# Patient Record
Sex: MALE | Race: WHITE | NOT HISPANIC OR LATINO | URBAN - METROPOLITAN AREA
[De-identification: names, ages, dates, MRNs, and addresses within clinical notes are randomized per-mention and may not be internally consistent; named-entity substitution may affect disease eponyms.]

---

## 2022-01-01 ENCOUNTER — EMERGENCY (EMERGENCY)
Facility: HOSPITAL | Age: 67
LOS: 0 days | End: 2022-10-03
Attending: EMERGENCY MEDICINE | Admitting: EMERGENCY MEDICINE

## 2022-01-01 VITALS — DIASTOLIC BLOOD PRESSURE: 70 MMHG | SYSTOLIC BLOOD PRESSURE: 110 MMHG | RESPIRATION RATE: 14 BRPM | HEART RATE: 64 BPM

## 2022-01-01 VITALS — HEIGHT: 74 IN | WEIGHT: 315 LBS

## 2022-01-01 DIAGNOSIS — I95.9 HYPOTENSION, UNSPECIFIED: ICD-10-CM

## 2022-01-01 DIAGNOSIS — R10.9 UNSPECIFIED ABDOMINAL PAIN: ICD-10-CM

## 2022-01-01 DIAGNOSIS — Z87.891 PERSONAL HISTORY OF NICOTINE DEPENDENCE: ICD-10-CM

## 2022-01-01 DIAGNOSIS — R53.83 OTHER FATIGUE: ICD-10-CM

## 2022-01-01 DIAGNOSIS — I71.30 ABDOMINAL AORTIC ANEURYSM, RUPTURED, UNSPECIFIED: ICD-10-CM

## 2022-01-01 DIAGNOSIS — I46.9 CARDIAC ARREST, CAUSE UNSPECIFIED: ICD-10-CM

## 2022-01-01 LAB
ALBUMIN SERPL ELPH-MCNC: 4.2 G/DL — SIGNIFICANT CHANGE UP (ref 3.5–5.2)
ALP SERPL-CCNC: 94 U/L — SIGNIFICANT CHANGE UP (ref 30–115)
ALT FLD-CCNC: 24 U/L — SIGNIFICANT CHANGE UP (ref 0–41)
ANION GAP SERPL CALC-SCNC: 23 MMOL/L — HIGH (ref 7–14)
APTT BLD: 26.5 SEC — LOW (ref 27–39.2)
AST SERPL-CCNC: 22 U/L — SIGNIFICANT CHANGE UP (ref 0–41)
BASOPHILS # BLD AUTO: 0.08 K/UL — SIGNIFICANT CHANGE UP (ref 0–0.2)
BASOPHILS NFR BLD AUTO: 1.2 % — HIGH (ref 0–1)
BILIRUB SERPL-MCNC: 0.5 MG/DL — SIGNIFICANT CHANGE UP (ref 0.2–1.2)
BLD GP AB SCN SERPL QL: SIGNIFICANT CHANGE UP
BUN SERPL-MCNC: 18 MG/DL — SIGNIFICANT CHANGE UP (ref 10–20)
CALCIUM SERPL-MCNC: 9.8 MG/DL — SIGNIFICANT CHANGE UP (ref 8.4–10.5)
CHLORIDE SERPL-SCNC: 107 MMOL/L — SIGNIFICANT CHANGE UP (ref 98–110)
CO2 SERPL-SCNC: 19 MMOL/L — SIGNIFICANT CHANGE UP (ref 17–32)
CREAT SERPL-MCNC: 1.2 MG/DL — SIGNIFICANT CHANGE UP (ref 0.7–1.5)
EGFR: 67 ML/MIN/1.73M2 — SIGNIFICANT CHANGE UP
EOSINOPHIL # BLD AUTO: 0.18 K/UL — SIGNIFICANT CHANGE UP (ref 0–0.7)
EOSINOPHIL NFR BLD AUTO: 2.7 % — SIGNIFICANT CHANGE UP (ref 0–8)
GLUCOSE SERPL-MCNC: 191 MG/DL — HIGH (ref 70–99)
HCT VFR BLD CALC: 42.1 % — SIGNIFICANT CHANGE UP (ref 42–52)
HGB BLD-MCNC: 12.9 G/DL — LOW (ref 14–18)
IMM GRANULOCYTES NFR BLD AUTO: 0.4 % — HIGH (ref 0.1–0.3)
INR BLD: 1.05 RATIO — SIGNIFICANT CHANGE UP (ref 0.65–1.3)
LACTATE SERPL-SCNC: 14.4 MMOL/L — CRITICAL HIGH (ref 0.7–2)
LYMPHOCYTES # BLD AUTO: 2.56 K/UL — SIGNIFICANT CHANGE UP (ref 1.2–3.4)
LYMPHOCYTES # BLD AUTO: 37.9 % — SIGNIFICANT CHANGE UP (ref 20.5–51.1)
MAGNESIUM SERPL-MCNC: 2.8 MG/DL — HIGH (ref 1.8–2.4)
MCHC RBC-ENTMCNC: 29.6 PG — SIGNIFICANT CHANGE UP (ref 27–31)
MCHC RBC-ENTMCNC: 30.6 G/DL — LOW (ref 32–37)
MCV RBC AUTO: 96.6 FL — HIGH (ref 80–94)
MONOCYTES # BLD AUTO: 0.56 K/UL — SIGNIFICANT CHANGE UP (ref 0.1–0.6)
MONOCYTES NFR BLD AUTO: 8.3 % — SIGNIFICANT CHANGE UP (ref 1.7–9.3)
NEUTROPHILS # BLD AUTO: 3.35 K/UL — SIGNIFICANT CHANGE UP (ref 1.4–6.5)
NEUTROPHILS NFR BLD AUTO: 49.5 % — SIGNIFICANT CHANGE UP (ref 42.2–75.2)
NRBC # BLD: 0 /100 WBCS — SIGNIFICANT CHANGE UP (ref 0–0)
PLATELET # BLD AUTO: 274 K/UL — SIGNIFICANT CHANGE UP (ref 130–400)
POTASSIUM SERPL-MCNC: 4.1 MMOL/L — SIGNIFICANT CHANGE UP (ref 3.5–5)
POTASSIUM SERPL-SCNC: 4.1 MMOL/L — SIGNIFICANT CHANGE UP (ref 3.5–5)
PROT SERPL-MCNC: 6.4 G/DL — SIGNIFICANT CHANGE UP (ref 6–8)
PROTHROM AB SERPL-ACNC: 12.1 SEC — SIGNIFICANT CHANGE UP (ref 9.95–12.87)
RBC # BLD: 4.36 M/UL — LOW (ref 4.7–6.1)
RBC # FLD: 12.8 % — SIGNIFICANT CHANGE UP (ref 11.5–14.5)
SARS-COV-2 RNA SPEC QL NAA+PROBE: SIGNIFICANT CHANGE UP
SODIUM SERPL-SCNC: 149 MMOL/L — HIGH (ref 135–146)
TROPONIN T SERPL-MCNC: <0.01 NG/ML — SIGNIFICANT CHANGE UP
WBC # BLD: 6.76 K/UL — SIGNIFICANT CHANGE UP (ref 4.8–10.8)
WBC # FLD AUTO: 6.76 K/UL — SIGNIFICANT CHANGE UP (ref 4.8–10.8)

## 2022-01-01 PROCEDURE — 36556 INSERT NON-TUNNEL CV CATH: CPT

## 2022-01-01 PROCEDURE — 99285 EMERGENCY DEPT VISIT HI MDM: CPT | Mod: 25

## 2022-01-01 PROCEDURE — 31500 INSERT EMERGENCY AIRWAY: CPT

## 2022-01-01 PROCEDURE — 71045 X-RAY EXAM CHEST 1 VIEW: CPT | Mod: 26

## 2022-01-01 RX ORDER — TRANEXAMIC ACID 100 MG/ML
1000 INJECTION, SOLUTION INTRAVENOUS ONCE
Refills: 0 | Status: COMPLETED | OUTPATIENT
Start: 2022-01-01 | End: 2022-01-01

## 2022-01-01 RX ORDER — KETAMINE HYDROCHLORIDE 100 MG/ML
100 INJECTION INTRAMUSCULAR; INTRAVENOUS ONCE
Refills: 0 | Status: DISCONTINUED | OUTPATIENT
Start: 2022-01-01 | End: 2022-01-01

## 2022-01-01 RX ADMIN — TRANEXAMIC ACID 220 MILLIGRAM(S): 100 INJECTION, SOLUTION INTRAVENOUS at 20:35

## 2022-10-03 PROBLEM — Z00.00 ENCOUNTER FOR PREVENTIVE HEALTH EXAMINATION: Status: ACTIVE | Noted: 2022-01-01

## 2022-10-03 NOTE — ED PROVIDER NOTE - PHYSICAL EXAMINATION
--EXAM--  VITAL SIGNS: I have reviewed vs documented at present.  CONSTITUTIONAL: Well-developed; well-nourished; in no acute distress.     CARD: S1, S2, Regular rate and rhythm.   RESP: No wheezes, rales or rhonchi.

## 2022-10-03 NOTE — ED PROVIDER NOTE - OBJECTIVE STATEMENT
this 66 yo male presents to ed for evaluation of abdominal pain since 730. wife called ambulance when pain got worse. ems states that patient more lethargic

## 2022-10-03 NOTE — ED PROVIDER NOTE - CARE PLAN
1 Principal Discharge DX:	Ruptured abdominal aortic aneurysm (AAA)  Secondary Diagnosis:	Cardiac arrest

## 2022-10-03 NOTE — ED PROVIDER NOTE - ATTENDING APP SHARED VISIT CONTRIBUTION OF CARE
I reviewed and verified the documentation and  and independently performed the documented  MDM.    67yM  (hx from wife and EMS) no pmhx although has not seen PMD in a "While",  former smoker "years ago", pw  abdominal pain  back pain  started  at 730pm - on EMS arrival  Pt hypotensive  unable to obtain BP -  in ED  bedside sono with free fluid RUQ and  AAA -   central line placed,  Massive transfusion protocol [place] site  initiated,  awake intubation performed.  lights and sirens EMS made aware as soon as AAA identified on bedside sono.  ALS lights and sirens called for transport north,  Vascular Dr Sorto  (around 815 , and ED  attending [place] site  made aware of patient. I reviewed and verified the documentation and  and independently performed the documented  MDM.    67yM  (hx from wife and EMS) no pmhx although has not seen PMD in a "While",  former smoker "years ago", pw  abdominal pain  back pain  started  at 730pm - on EMS arrival  Pt hypotensive  unable to obtain BP -  in ED  bedside sono with free fluid RUQ and  AAA -   central line placed,  Massive transfusion protocol [facility] site  initiated,  awake intubation performed for airway protection.  lights and sirens EMS made aware as soon as AAA identified on bedside sono.  ALS lights and sirens called for transport north,  Vascular Dr Sorto  (around 815 , and ED  attending [place] site  made aware of patient.    call from Medical director  Dr Palomares  around 830 -  need approval  for  additional trained personnel in ambulance. To expedite,  ED  south [facility] sent to go with ambulance - once approval was granted  pt   sent  [place].   Pt stable  for transport - Intubated  - ETT  confirmed on CXR,  visualization  during glidescope,  bl BS,  PRBC  running  m  /60.   Dw  Vascular again - aware of  patient being transferred now to [place]

## 2022-10-03 NOTE — CHART NOTE - NSCHARTNOTEFT_GEN_A_CORE
Patient was seen in ED upon transfer from [facility]. He was intubated and undergoing active CPR, with a right femoral central line.  Per EMS he was getting CPR for 15 minutes with no BP detectable.  Bedside cardiac US showed slight activity in myocardium.  We performed a ED thoracotomy in 4th interspace and after retracting the lung medially, there was no pulse in the thoracic aorta.  The mid-thoracic aorta was manually compressed for a few minutes (due to body habitus, the finochietto retractor was not fitting)  No pulse in central system was detected and cardiac activity was not regained.  Patient was pronounced .

## 2022-10-03 NOTE — ED PROVIDER NOTE - NS ED ATTENDING STATEMENT MOD
This was a shared visit with the GABINO. I reviewed and verified the documentation and independently performed the documented:

## 2022-10-03 NOTE — ED PROVIDER NOTE - COMMENTS
Patient arrived at [facility] in cardiac arrest. Massive Transfusion protocol called. Thoracotomy performed, time of death called at 21:32 on 10/3/2022. [facility] [place] information: Contact name: Xiomy, referral number: [id number]. Patient is a candidate for tissue and eye donation, per [place] [facility] [place]. Time that patient goes down to [facility] to be documented (pending). Patient arrived at [facility] in cardiac arrest. Massive Transfusion protocol called. Thoracotomy performed, time of death called at 21:32 on 10/3/2022. [facility] [place] information: Contact name: Xiomy, referral number: [id number]. Patient is a candidate for tissue and eye donation, per [place] [facility] [place]. Time that patient went down to [facility]: 12:15/12:20am.

## 2022-10-03 NOTE — ED PROCEDURE NOTE - NS ED ATTENDING STATEMENT MOD
This was a shared visit with the GABINO. I reviewed and verified the documentation and independently performed the documented:
This was a shared visit with the GABINO. I reviewed and verified the documentation and independently performed the documented:

## 2022-10-03 NOTE — ED ADULT NURSE REASSESSMENT NOTE - NS ED NURSE REASSESS COMMENT FT1
Pt came in at approximately 8:10 pm to rule out aortic aneurysm.  Pt was responsive at that time, after a few minutes the patient became less arousable and the PA's determined the need for intubation  Vitals taken. Pt intubated.  As per PA, no sedation was necessary  R femoral access placed.   Respiratory at bedside performing ventilation until EMS showed up to take the patient to [place].   Pt received 4 blood transfusions.  Pt left with EMS at approximately 9 pm.

## 2022-10-03 NOTE — ED ADULT TRIAGE NOTE - CHIEF COMPLAINT QUOTE
pre-arrival notification for R/O AAA, as per EMS pt was awake and alert on scene, and fainted upon arrival

## 2022-10-03 NOTE — ED PROVIDER NOTE - PROGRESS NOTE DETAILS
JK: While en route to [place] from [facility], patient went into cardiac arrest. Compressions started (performed for about 15 minutes before arrival to [place]). No blood pressure detectable. Thoracotomy performed by vascular team in the ED @ [place], pt was noted to have no pulse in the thoracic aorta, per vascular. Pt's thoracic aorta was manually compressed by vascular team. No ROSC, no cardiac activity noted on ultrasound. Time of death pronounced at 21:32. [place] and medical records called. JK: While en route to [place] from [facility] site, patient went into cardiac arrest. Compressions started (performed for about 15 minutes before arrival to [place]). No blood pressure detected. Thoracotomy performed by vascular team in the ED @ [place], pt was noted to have no pulse in the thoracic aorta, per vascular. Pt's thoracic aorta was manually compressed by vascular team. No ROSC, no cardiac activity noted on ultrasound. Time of death pronounced at 21:32. [place] and medical records called. SHANNAN: notified by crit lead Radha SALCEDO that patient went down to the morgue at approximately 12:15/12:20am ([place] requesting that I document this in my provider note).